# Patient Record
Sex: FEMALE | URBAN - METROPOLITAN AREA
[De-identification: names, ages, dates, MRNs, and addresses within clinical notes are randomized per-mention and may not be internally consistent; named-entity substitution may affect disease eponyms.]

---

## 2022-09-14 ENCOUNTER — APPOINTMENT (RX ONLY)
Dept: URBAN - METROPOLITAN AREA CLINIC 15 | Facility: CLINIC | Age: 27
Setting detail: DERMATOLOGY
End: 2022-09-14

## 2022-09-14 DIAGNOSIS — L81.0 POSTINFLAMMATORY HYPERPIGMENTATION: ICD-10-CM

## 2022-09-14 DIAGNOSIS — L80 VITILIGO: ICD-10-CM | Status: STABLE

## 2022-09-14 PROCEDURE — 99204 OFFICE O/P NEW MOD 45 MIN: CPT

## 2022-09-14 PROCEDURE — ? PRESCRIPTION MEDICATION MANAGEMENT

## 2022-09-14 PROCEDURE — ? RECOMMENDATIONS

## 2022-09-14 PROCEDURE — ? COUNSELING

## 2022-09-14 PROCEDURE — ? IN-HOUSE DISPENSING PHARMACY

## 2022-09-14 ASSESSMENT — LOCATION DETAILED DESCRIPTION DERM
LOCATION DETAILED: LEFT MEDIAL FOREHEAD
LOCATION DETAILED: LEFT SUPERIOR LATERAL BUCCAL CHEEK
LOCATION DETAILED: RIGHT INFERIOR CENTRAL MALAR CHEEK

## 2022-09-14 ASSESSMENT — LOCATION SIMPLE DESCRIPTION DERM
LOCATION SIMPLE: LEFT FOREHEAD
LOCATION SIMPLE: LEFT CHEEK
LOCATION SIMPLE: RIGHT CHEEK

## 2022-09-14 ASSESSMENT — LOCATION ZONE DERM: LOCATION ZONE: FACE

## 2022-09-14 NOTE — PROCEDURE: IN-HOUSE DISPENSING PHARMACY
Product 31 Unit Type: mg
Product 71 Amount/Unit (Numbers Only): 0
Name Of Product 11: LATIESSE  5ml
Product 21 Price/Unit (In Dollars): [address]
Product 4 Price/Unit (In Dollars): 115.56
Render Refills If Set To 0: Yes
Product 1 Amount/Unit (Numbers Only): [address] [address]ritchett
Name Of Product 1: 8% HQ cream
Product 4 Application Directions: Apply a pea size amount to the entire face at night
Detail Level: Zone
Product 1 Refills: 1
Product 11 Amount/Unit (Numbers Only): 5
Product 21 Unit Type: unit(s)
Name Of Product 4: Skin brightening cream with 8% Hydroquinone
Product 1 Application Directions: Apply a thin layer to the affected areas of the face at night
Product 11 Price/Unit (In Dollars): [address] [address]
Product 1 Unit Type: grams
Product 11 Refills: 3
Product 21 Application Directions: Apply one drop to each eye once a day
Product 4 Amount/Unit (Numbers Only): 30
Product 11 Application Directions: Apply a drop to each eyelid daily
Product 21 [facility]Unit (Numbers Only): [address] [facility]
Name Of Product 21: Upneeq eye drops
Product 11 Unit Type: ml

## 2022-09-14 NOTE — PROCEDURE: MIPS QUALITY
Detail Level: Detailed
Quality 130: Documentation Of Current Medications In The Medical Record: Documentation of a medical reason(s) for not documenting, updating, or reviewing the patientâs current medications list (e.g., patient is in an urgent or emergent medical situation)
Additional Notes: Patient takes no meds

## 2022-09-14 NOTE — PROCEDURE: COUNSELING
Bleaching Agents Recommendations: .\\n8% HQ cream treatment.  Apply to the affected areas of the body nightly
Detail Level: Simple
Sunscreen Recommendations: Daily sunscreen with at least SPF [id number] Robert Pritchett

## 2022-09-14 NOTE — PROCEDURE: RECOMMENDATIONS
Recommendations (Free Text): Sanchez laser perioral $350 per treatment
Detail Level: Zone
Render Risk Assessment In Note?: no

## 2022-09-14 NOTE — PROCEDURE: PRESCRIPTION MEDICATION MANAGEMENT
Render In Strict Bullet Format?: No
Detail Level: Zone
Initiate Treatment: Charla Aas \\nMoisturizer \\nUse sunscreen \\n\\nPm only \\nWash \\n8% HQ cream\\nDirections: Apply a thin layer to the affected areas of the face at night\\nMoisturizer
Plan: .\\nCondition is barely visible due to patient Skin tone. No treatment necessary at the moment \\n.

## 2022-10-11 ENCOUNTER — APPOINTMENT (RX ONLY)
Dept: URBAN - METROPOLITAN AREA CLINIC 15 | Facility: CLINIC | Age: 27
Setting detail: DERMATOLOGY
End: 2022-10-11

## 2022-10-11 VITALS — HEIGHT: 65 IN | WEIGHT: 125 LBS

## 2022-10-11 DIAGNOSIS — Z41.9 ENCOUNTER FOR PROCEDURE FOR PURPOSES OTHER THAN REMEDYING HEALTH STATE, UNSPECIFIED: ICD-10-CM

## 2022-10-11 PROCEDURE — ? PICOWAY LASER

## 2022-10-11 PROCEDURE — ? ADDITIONAL NOTES

## 2022-10-11 ASSESSMENT — LOCATION ZONE DERM: LOCATION ZONE: FACE

## 2022-10-11 ASSESSMENT — LOCATION SIMPLE DESCRIPTION DERM: LOCATION SIMPLE: RIGHT CHEEK

## 2022-10-11 ASSESSMENT — LOCATION DETAILED DESCRIPTION DERM
LOCATION DETAILED: RIGHT SUPERIOR MEDIAL BUCCAL CHEEK
LOCATION DETAILED: RIGHT MEDIAL BUCCAL CHEEK

## 2022-10-11 NOTE — PROCEDURE: ADDITIONAL NOTES
Detail Level: Detailed
Render Risk Assessment In Note?: no
Additional Notes: .\\n\\nApply aquaphor twice daily to the treated area. Avoid applying retinol on the location and sun exposure for minimum 5-7 days. Apply SPF daily. \\n\\nRecommending:\\n\\nCicalfate apply to treated area twice daily.

## 2022-10-11 NOTE — HPI: COSMETIC (LASER BROWN SPOTS)
Have You Had Laser Removal Of Brown Spots Before?: has not had previous treatment
Additional History: Patient is in office for [address] treatment #1. Numbing was applied. Consent signed.

## 2022-10-11 NOTE — PROCEDURE: [place] LASER
Spot Size: 6.0 mm
Price (Use Numbers Only, No Special Characters Or $): [address]
Wavelength: 532 nm
Treatment Number: 1
External Cooling Fan Speed: 0
Consent: Written consent obtained, risks reviewed including but not limited to crusting, scabbing, blistering, scarring, darker or lighter pigmentary change, paradoxical hair regrowth, incomplete removal of hair and infection.
Pulse Duration: 2.5 ms
Frequency (Hz): 2hz
Fluence (J/Cm2): 2
Post-Care Instructions: I reviewed with the patient in detail post-care instructions. Patient should avoid sun for a minimum of 4 weeks before and after treatment.
Spot Size: 3.0 mm
Detail Level: Simple
Handpiece: Zoom
Fluence (J/Cm2): 1.8
Topical Anesthesia Type: 20% benzocaine, 8% lidocaine, 4% tetracaine
Fluence (J/Cm2): 0.65
Handpiece: Resolve
Pre-Procedure: Prior to proceeding the treatment areas were cleaned and all present put on their eye protection.
Spot Size: 2.0 mm
Length Of Topical Anesthesia Application (Optional): 40 minutes
Frequency (Hz): 2 Hz
Hide Pulse Duration?: No
Frequency (Hz): 8Hz
Fluence (J/Cm2): 0.9
Post-Procedure Care: Immediate endpoint: perifollicular erythema and edema. Vaseline and ice applied. Post care reviewed with patient.
Wavelength: 1064 nm
Spot Size: 4.0 mm

## 2023-01-24 ENCOUNTER — APPOINTMENT (RX ONLY)
Dept: URBAN - METROPOLITAN AREA CLINIC 15 | Facility: CLINIC | Age: 28
Setting detail: DERMATOLOGY
End: 2023-01-24

## 2023-01-24 DIAGNOSIS — Z41.9 ENCOUNTER FOR PROCEDURE FOR PURPOSES OTHER THAN REMEDYING HEALTH STATE, UNSPECIFIED: ICD-10-CM

## 2023-01-24 PROCEDURE — ? PICOWAY LASER

## 2023-01-24 PROCEDURE — ? PRESCRIPTION

## 2023-01-24 PROCEDURE — ? ADDITIONAL NOTES

## 2023-01-24 ASSESSMENT — LOCATION SIMPLE DESCRIPTION DERM: LOCATION SIMPLE: RIGHT CHEEK

## 2023-01-24 ASSESSMENT — LOCATION DETAILED DESCRIPTION DERM
LOCATION DETAILED: RIGHT SUPERIOR MEDIAL BUCCAL CHEEK
LOCATION DETAILED: RIGHT MEDIAL BUCCAL CHEEK

## 2023-01-24 ASSESSMENT — LOCATION ZONE DERM: LOCATION ZONE: FACE

## 2023-01-24 NOTE — PROCEDURE: [place] LASER
Spot Size: 6.0 mm
Price (Use Numbers Only, No Special Characters Or $): [address]
Wavelength: 532 nm
Treatment Number: 1
External Cooling Fan Speed: 0
Consent: Written consent obtained, risks reviewed including but not limited to crusting, scabbing, blistering, scarring, darker or lighter pigmentary change, paradoxical hair regrowth, incomplete removal of hair and infection.
Pulse Duration: 2.5 ms
Frequency (Hz): 2hz
Fluence (J/Cm2): 2
Post-Care Instructions: I reviewed with the patient in detail post-care instructions. Patient should avoid sun for a minimum of 4 weeks before and after treatment.
Spot Size: 3.0 mm
Detail Level: Simple
Handpiece: Zoom
Topical Anesthesia Type: 20% benzocaine, 8% lidocaine, 4% tetracaine
Fluence (J/Cm2): 0.65
Handpiece: Resolve
Pre-Procedure: Prior to proceeding the treatment areas were cleaned and all present put on their eye protection.
Spot Size: 2.0 mm
Length Of Topical Anesthesia Application (Optional): 40 minutes
Frequency (Hz): 2 Hz
Hide Pulse Duration?: No
Frequency (Hz): 8Hz
Fluence (J/Cm2): 0.9
Post-Procedure Care: Immediate endpoint: perifollicular erythema and edema. Vaseline and ice applied. Post care reviewed with patient.
Wavelength: 1064 nm
Handpiece: Fusion
Spot Size: 4.0 mm

## 2023-01-24 NOTE — PROCEDURE: ADDITIONAL NOTES
Detail Level: Detailed
Render Risk Assessment In Note?: no
Additional Notes: .\\n\\nApply aquaphor twice daily to the treated area. Avoid applying retinol on the location and sun exposure for minimum 5-7 days. Apply SPF daily. \\n\\nRecommending:\\n\\nCicalfate apply to treated area twice daily. \\n\\nBleaching cream sent to [facility] [phone].

## 2023-01-25 RX ORDER — PHARMACY COMPOUNDING ACCESSORY
EACH MISCELLANEOUS QHS
Qty: 15 | Refills: 0 | Status: ERX | COMMUNITY
Start: 2023-01-25

## 2023-01-25 RX ADMIN — Medication: at 00:00
